# Patient Record
Sex: MALE | Race: WHITE | NOT HISPANIC OR LATINO | Employment: FULL TIME | URBAN - METROPOLITAN AREA
[De-identification: names, ages, dates, MRNs, and addresses within clinical notes are randomized per-mention and may not be internally consistent; named-entity substitution may affect disease eponyms.]

---

## 2017-12-15 ENCOUNTER — OCCUPATIONAL MEDICINE (OUTPATIENT)
Dept: URGENT CARE | Facility: PHYSICIAN GROUP | Age: 22
End: 2017-12-15
Payer: COMMERCIAL

## 2017-12-15 VITALS
OXYGEN SATURATION: 96 % | HEART RATE: 72 BPM | WEIGHT: 142 LBS | BODY MASS INDEX: 20.33 KG/M2 | RESPIRATION RATE: 12 BRPM | DIASTOLIC BLOOD PRESSURE: 74 MMHG | TEMPERATURE: 98.6 F | HEIGHT: 70 IN | SYSTOLIC BLOOD PRESSURE: 122 MMHG

## 2017-12-15 DIAGNOSIS — S01.81XA FACIAL LACERATION, INITIAL ENCOUNTER: ICD-10-CM

## 2017-12-15 PROCEDURE — 12011 RPR F/E/E/N/L/M 2.5 CM/<: CPT | Performed by: EMERGENCY MEDICINE

## 2017-12-15 ASSESSMENT — ENCOUNTER SYMPTOMS
EYE PAIN: 0
BLURRED VISION: 0
NAUSEA: 0
LOSS OF CONSCIOUSNESS: 0
ABDOMINAL PAIN: 0
DOUBLE VISION: 0
EYE REDNESS: 0
CHILLS: 0
FEVER: 0
NECK PAIN: 0
COUGH: 0
VOMITING: 0
NERVOUS/ANXIOUS: 0
SEIZURES: 0

## 2017-12-15 NOTE — LETTER
[facility] [address] [address]  Phone:  [phone] - Fax:  [phone]   [facility] Progress Report and Disability Certification  Date of Service: 12/15/2017   No Show:  No  Date / Time of Next Visit: 12/20/2017   Claim Information   Patient Name: Juvencio Phelps  Claim Number:     Employer: [employer]  Date of Injury: 12/15/2017     Insurer / TPA: Sherin [place]  ID / SSN:     Occupation:  sruthie tech /  Diagnosis: The encounter diagnosis was Facial laceration, initial encounter.    Medical Information   Related to Industrial Injury? Yes    Subjective Complaints:  Patient describes her date of injury 12/15/17. Patient is a pleasant 22-year-old male who was hit by a thin metal grate just lateral to his right eye he received a 2-1/2 cm laceration not involving his eyes or his vision. Patient's last tetanus shot was 9 years ago. This injury occurred approximately 1 PM while working at a local [employer].   Objective Findings: Vital signs 122/74, her rate is 72, respiratory rate 12, temperature 37.0 with a pulse oximetry of 96% on room air.    Patient has a 2 1/2 centimeter laceration just lateral to his right eye. The laceration is through, the skin skin no deep structures and fall. Procedure explained  to the patient, who agreed to undergo with the procedure. The wound was cleaned with normal saline, block with 1% Xylocaine, 6-0 interrupted nylon sutures were placed. Good hemostasis was obtained, Polysporin was placed on the wound. Without any complications.   Pre-Existing Condition(s): Patient has no other job he has no previous injuries to his face or other work-related injuries.   Assessment:   Initial Visit    Status: Additional Care Required  Permanent Disability:No    Plan:   Comments:patient will have the wound sutured. He will keep the wound clean placing Polysporin ointment on the wound 4 times a day.  Return in 5 days to have the sutures removed.    Diagnostics:      Comments:       Disability Information   Status: Released to Full Duty    From:  12/15/2017  Through: 12/20/2017 Restrictions are: Temporary   Physical Restrictions   Sitting:    Standing:    Stooping:    Bending:      Squatting:    Walking:    Climbing:    Pushing:      Pulling:    Other:    Reaching Above Shoulder (L):   Reaching Above Shoulder (R):       Reaching Below Shoulder (L):    Reaching Below Shoulder (R):      Not to exceed Weight Limits   Carrying(hrs):   Weight Limit(lb):   Lifting(hrs):   Weight  Limit(lb):     Comments: Patient will keep the wound clean and covered with Polysporin return in 5 days for suture removal. He will return sooner if he develops any redness swelling signs of infection.    Repetitive Actions   Hands: i.e. Fine Manipulations from Grasping:     Feet: i.e. Operating Foot Controls:     Driving / Operate Machinery:     Physician Name: Edilson Rice M.D. Physician Signature: DANIAL Coffman M.D. e-Signature: Dr. Shayne Ruiz, Medical Director   Clinic Name / Location: [address] [address] Clinic Phone Number: Dept: [phone]   Appointment Time: 1:25 Pm Visit Start Time: 2:12 PM   Check-In Time:  1:50 Pm Visit Discharge Time: 2:47 PM    Original-Treating Physician or Chiropractor    Page 2-Insurer/TPA    Page 3-Employer    Page 4-Employee

## 2017-12-15 NOTE — LETTER
"EMPLOYEE’S CLAIM FOR COMPENSATION/ REPORT OF INITIAL TREATMENT  FORM C-4    EMPLOYEE’S CLAIM - PROVIDE ALL INFORMATION REQUESTED   First Name  Juvencio Last Name  Mattie Birthdate                    1995                Sex  male Claim Number   Home Address  [address] Age  22 y.o. 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 (if applicable)  Closing pit cover grates    How did this injury or occupational disease occur? (Be specific an answer in detail. Use additional sheet if necessary)  When closing the pit cover grates. One grate pulled foward unintentionally when i turned around to grab and pull it foward it slid into my head and cut my eye   If you believe that you have an occupational disease, when did you first have knowledge of the disability and it relationship to your employment?   Witnesses to the Accident  None      Nature of Injury or Occupational Disease  Laceration  Part(s) of Body Injured or Affected  Eye (R), ,     I certify that the above is true and " correct to the best of my knowledge and that I have provided this information in order to obtain the benefits of [place]  I hereby authorize any physician, chiropractor, surgeon, practitioner, or other person, any hospital, including [facility] or [facility], any medical service organization, any insurance company, or other institution or organization to release to each other, any medical or other information, including benefits paid or payable, pertinent to this injury or disease, except information relative to diagnosis, treatment and/or counseling for AIDS, psychological conditions, alcohol or controlled substances, for which I must give specific authorization.  A Photostat of this authorization shall be as valid as the original.     Date   Place   Employee’s Signature   THIS REPORT MUST BE COMPLETED AND MAILED WITHIN 3 WORKING DAYS OF TREATMENT   Place  [facility]  Name of Facility  [place]   Date  12/15/2017 Diagnosis  (S01.81XA) Facial laceration, initial encounter Is there evidence the injured employee was under the influence of alcohol and/or another controlled substance at the time of accident?   Hour  2:12 PM Description of Injury or Disease  The encounter diagnosis was Facial laceration, initial encounter. No   Treatment  Patient had his wound clean block with 1% Xylocaine and sutured with 6-0 interrupted sutures. The wound was subsequently covered with Polysporin patient was given instructions to keep it clean and watch for infection and return in 5 days for suture removal.  Have you advised the patient to remain off work five days or more? No   X-Ray Findings      If Yes   From Date  To Date      From information given by the employee, together with medical evidence, can you directly connect this injury or occupational disease as job incurred?  Yes If No  "Full Duty  Yes Modified Duty      Is additional medical care by a physician indicated?  Yes If Modified Duty, Specify any Limitations / Restrictions  Please refer to the D-39 form.   Do you know of any previous injury or disease contributing to this condition or occupational disease?                            No   Date  12/15/2017 Print Doctor’s Name Edilson Rice M.D. I certify the employer’s copy of  this form was mailed on:   Address  [address] [facility] Insurer’s Use Only     [facility]  [address]    Provider’s Tax ID Number  [id number] Telephone  Dept: [phone]        e-DANIAL Ojeda M.D.   e-Signature: Dr. Shayne Ruiz, Medical Director Degree  MD        ORIGINAL-TREATING PHYSICIAN OR CHIROPRACTOR    PAGE 2-INSURER/TPA    PAGE 3-EMPLOYER    PAGE 4-EMPLOYEE             Form C-4 (rev10/07)              BRIEF DESCRIPTION OF RIGHTS AND BENEFITS  (Pursuant to [place]RS 616C.050)    Notice of Injury or Occupational Disease (Incident Report Form C-1): If an injury or occupational disease (OD) arises out of and in the  course of employment, you must provide written notice to your employer as soon as practicable, but no later than 7 days after the accident or  OD. Your employer shall maintain a sufficient supply of the required forms.    Claim for Compensation (Form C-4): If medical treatment is sought, the form C-4 is available at the place of initial treatment. A completed  \"Claim for Compensation\" (Form C-4) must be filed within 90 days after an accident or OD. The treating physician or chiropractor must,  within 3 working days after treatment, complete and mail to the employer, the employer's insurer and third-party , the Claim for  Compensation.    Medical Treatment: If you require medical treatment for your on-the-job injury or OD, you may be required to select a physician or  chiropractor from a list provided by your workers’ compensation insurer, if it has contracted " with an Organization for Managed Care (MCO) or  Preferred Provider Organization (PPO) or providers of health care. If your employer has not entered into a contract with an MCO or PPO, you  may select a physician or chiropractor from the Panel of Physicians and Chiropractors. Any medical costs related to your industrial injury or  OD will be paid by your insurer.    Temporary Total Disability (TTD): If your doctor has certified that you are unable to work for a period of at least 5 consecutive days, or 5  cumulative days in a 20-day period, or places restrictions on you that your employer does not accommodate, you may be entitled to TTD  compensation.    Temporary Partial Disability (TPD): If the wage you receive upon reemployment is less than the compensation for TTD to which you are  entitled, the insurer may be required to pay you TPD compensation to make up the difference. TPD can only be paid for a maximum of 24  months.    Permanent Partial Disability (PPD): When your medical condition is stable and there is an indication of a PPD as a result of your injury or  OD, within 30 days, your insurer must arrange for an evaluation by a rating physician or chiropractor to determine the degree of your PPD. The  amount of your PPD award depends on the date of injury, the results of the PPD evaluation and your age and wage.    Permanent Total Disability (PTD): If you are medically certified by a treating physician or chiropractor as permanently and totally disabled  and have been granted a PTD status by your insurer, you are entitled to receive monthly benefits not to exceed 66 2/3% of your average  monthly wage. The amount of your PTD payments is subject to reduction if you previously received a PPD award.    Vocational Rehabilitation Services: You may be eligible for vocational rehabilitation services if you are unable to return to the job due to a  permanent physical impairment or permanent restrictions as a result of  your injury or occupational disease.    Transportation and Per Ulices Reimbursement: You may be eligible for travel expenses and per ulices associated with medical treatment.    Reopening: You may be able to reopen your claim if your condition worsens after claim closure.    Appeal Process: If you disagree with a written determination issued by the insurer or the insurer does not respond to your request, you may  appeal to the Department of Administration, , by following the instructions contained in your determination letter. You must  appeal the determination within 70 days from the date of the determination letter at [address], [address], [place], [place]  [address], or [address], [address], [place], [place] [address]. If you disagree with the  decision, you may appeal to the  Department of Administration, . You must file your appeal within 30 days from the date of the  decision  letter at [address], [address], [place], [place] [address], or [address] [facility] [address] [place], [place] [address]. If you  disagree with a decision of an , you may file a petition for judicial review with the District Court. You must do so within 30  days of the Appeal Officer’s decision. You may be represented by an  at your own expense or you may contact the [facility] for possible  representation.    [place]  for Injured Workers (NAIW): If you disagree with a  decision, you may request that NAIW represent you  without charge at an  Hearing. For information regarding denial of benefits, you may contact the [facility] at: [address], [address] [address], [phone], or [address] [facility] [address], [place] [address], [phone]    To File a Complaint with the Division: If you wish to file a complaint with the  of the Division of Industrial  Relations (DIR),  please contact the Workers’ Compensation Section, [address], [address], [place], [place] [address], telephone (443) 072-9335, or  [address], [address], [place], [place] [address], telephone [phone].    For assistance with Workers’ Compensation Issues: you may contact the Office of the [facility] Consumer Health Assistance, [address] [place] [address], [address], [place], [place] [address], Toll Free 1-995.877.3533, Web site: http://govcha.[facility].nv., E-mail  Trina@[facility].nv.                                                                                                                                                                                                                                   __________________________________________________________________                                                                   _________________                Employee Name / Signature                                                                                                                                                       Date                                                                                                                                                                                                     D-2 (rev. 10/07)

## 2017-12-15 NOTE — PROGRESS NOTES
"Subjective:      Juvencio Phelps is a 22 y.o. male who presents with Facial Laceration (WC new, above R eye )      Patient describes her date of injury 12/15/17. Patient is a pleasant 22-year-old male who was hit by a thin metal grate just lateral to his right eye he received a 2-1/2 cm laceration not involving his eyes or his vision. Patient's last tetanus shot was 9 years ago. 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     HPI    Review of Systems   Constitutional: Negative for chills and fever.   Eyes: Negative for blurred vision, double vision, pain and redness.   Respiratory: Negative for cough.    Cardiovascular: Negative for chest pain.   Gastrointestinal: Negative for abdominal pain, nausea and vomiting.   Musculoskeletal: Negative for neck pain.   Skin:        Laceration lateral to his right eye between his eyebrow and upper lid see the diagram. 2-1/2 cm in length.   Neurological: Negative for seizures and loss of consciousness.   Psychiatric/Behavioral: The patient is not nervous/anxious.           Objective:     /74   Pulse 72   Temp 37 °C (98.6 °F)   Resp 12   Ht 1.778 m (5' 10\")   Wt 64.4 kg (142 lb)   SpO2 96%   BMI 20.37 kg/m²      Physical Exam    Vital signs 122/74, her rate is 72, respiratory rate 12, temperature 37.0 with a pulse oximetry of 96% on room air.    Patient has a 2 1/2 centimeter laceration just lateral to his right eye. The laceration is through, the skin skin no deep structures and fall. Procedure explained  to the patient, who agreed to undergo with the procedure. The wound was cleaned with normal saline, block with 1% Xylocaine, 6-0 interrupted nylon sutures were placed. Good hemostasis was obtained, Polysporin was placed on the wound. Without any complications.       Assessment/Plan:     1. Facial laceration, initial encounter    Please refer to the D-39 form    "

## 2017-12-20 ENCOUNTER — OCCUPATIONAL MEDICINE (OUTPATIENT)
Dept: URGENT CARE | Facility: PHYSICIAN GROUP | Age: 22
End: 2017-12-20
Payer: COMMERCIAL

## 2017-12-20 VITALS
TEMPERATURE: 98.2 F | RESPIRATION RATE: 16 BRPM | HEART RATE: 76 BPM | HEIGHT: 70 IN | SYSTOLIC BLOOD PRESSURE: 124 MMHG | DIASTOLIC BLOOD PRESSURE: 68 MMHG | WEIGHT: 142 LBS | OXYGEN SATURATION: 98 % | BODY MASS INDEX: 20.33 KG/M2

## 2017-12-20 DIAGNOSIS — S01.81XD FACIAL LACERATION, SUBSEQUENT ENCOUNTER: ICD-10-CM

## 2017-12-20 ASSESSMENT — ENCOUNTER SYMPTOMS
NAUSEA: 0
EYE REDNESS: 0
EYE DISCHARGE: 0
NERVOUS/ANXIOUS: 0
FEVER: 0
NECK PAIN: 0
CHILLS: 0
EYE PAIN: 0

## 2017-12-20 NOTE — LETTER
[facility] [address] [place] [address]  Phone:  [phone] - Fax:  [phone]   [facility] Progress Report and Disability Certification  Date of Service: 12/20/2017   No Show:  No  Date / Time of Next Visit:  patient released to work with MMIAs of today.    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    Medical Information   Related to Industrial Injury? Yes    Subjective Complaints:   date of injury 12/15/17 patient 22-year-old hit by a thin metal grate while at work cutting his face just lateral to his right eye with a 2 cm laceration. It was sutured the day of injury. Patient had 3 sutures placed this had no complications since that time treatment.   Objective Findings: Patient's blood pressure is 124/68 temperature is 36.8. Patient has a well-healed laceration lateral to his right eye with 3 interrupted 6-0 sutures in place. There is ecchymosis around his eyelids that wasn't present at the time of his injury secondary to migrating hematoma. Pupils are equal round reactive to light. Patient has no compromise of physician.   Pre-Existing Condition(s): None.   Assessment:   Condition Improved    Status: Discharged /  MMI  Permanent Disability:No    Plan:   Comments:patient had sutures removed in the urgent care will keep the wound covered with Polysporin.    Diagnostics:      Comments:       Disability Information   Status: Released to Full Duty    From:     Through:   Restrictions are:     Physical Restrictions   Sitting:    Standing:    Stooping:    Bending:      Squatting:    Walking:    Climbing:    Pushing:      Pulling:    Other:    Reaching Above Shoulder (L):   Reaching Above Shoulder (R):       Reaching Below Shoulder (L):   Reaching Below Shoulder (R):      Not to exceed Weight Limits   Carrying(hrs):   Weight Limit(lb):   Lifting(hrs):   Weight  Limit(lb):     Comments: Patient will return to work with MMI.    Repetitive Actions   Hands: i.e. Fine Manipulations from Grasping:     Feet: i.e. Operating Foot Controls:     Driving / Operate Machinery:     Physician Name: Edilson Rice M.D. Physician Signature: DANIAL Coffman M.D. e-Signature: Dr. Shayne Ruiz, Medical Director   Clinic Name / Location: [address] [address] Clinic Phone Number: Dept: [phone]   Appointment Time: 4:00 Pm Visit Start Time: 4:13 PM   Check-In Time:  4:12 Pm Visit Discharge Time: 4:23 PM    Original-Treating Physician or Chiropractor    Page 2-Insurer/TPA    Page 3-Employer    Page 4-Employee

## 2017-12-20 NOTE — LETTER
there are there is to form [id number]   [facility] [place]  [address] [place], [place] [address], [place] [address]  Phone:  [phone] - Fax:  [phone]   [facility] Progress Report and Disability Certification  Date of Service: 12/20/2017   No Show:  No  Date / Time of Next Visit:     Claim Information   Patient Name: Juvencio Phelps  Claim Number:     Employer: [employer]  Date of Injury: 12/15/2017     Insurer / TPA: Sherin [place]  ID / SSN:     Occupation:   Diagnosis: The encounter diagnosis was Facial laceration, subsequent encounter.    Medical Information   Related to Industrial Injury? Yes   Subjective Complaints:   date of injury 12/15/17 patient 22-year-old hit by a thin metal grate while at work cutting his face just lateral to his right eye with a 2 cm laceration. It was sutured the day of injury. Patient had 3 sutures placed this had no complications since that time treatment.   Objective Findings: Patient's blood pressure is 124/68 temperature is 36.8. Patient has a well-healed laceration lateral to his right eye with 3 interrupted 6-0 sutures in place. There is ecchymosis around his eyelids that wasn't present at the time of his injury secondary to migrating hematoma. Pupils are equal round reactive to light. Patient has no compromise of physician.   Pre-Existing Condition(s): None.   Assessment:   Condition Improved    Status: Discharged /  MMI  Permanent Disability:No    Plan:   Comments:patient had sutures removed in the urgent care will keep the wound covered with Polysporin.    Diagnostics:      Comments:       Disability Information   Status: Released to Full Duty    From:     Through:   Restrictions are:     Physical Restrictions   Sitting:    Standing:    Stooping:    Bending:      Squatting:    Walking:    Climbing:    Pushing:      Pulling:    Other:    Reaching Above Shoulder (L):   Reaching Above Shoulder (R):       Reaching  Below Shoulder (L):    Reaching Below Shoulder (R):      Not to exceed Weight Limits   Carrying(hrs):   Weight Limit(lb):   Lifting(hrs):   Weight  Limit(lb):     Comments: Patient will return to work with MMI.    Repetitive Actions   Hands: i.e. Fine Manipulations from Grasping:     Feet: i.e. Operating Foot Controls:     Driving / Operate Machinery:     Physician Name: Edilson Rice M.D. Physician Signature: DANIAL Coffman M.D. e-Signature: Dr. Shayne Ruiz, Medical Director   Clinic Name / Location: [address] [address] Clinic Phone Number: Dept: [phone]   Appointment Time: 4:00 Pm Visit Start Time: 4:13 PM   Check-In Time:  4:12 Pm Visit Discharge Time: 4:25 PM    Original-Treating Physician or Chiropractor    Page 2-Insurer/TPA    Page 3-Employer    Page 4-Employee

## 2017-12-21 NOTE — PROGRESS NOTES
"T5 to work cough or disease transfer to the patient's chart used a lot of 5 he had just a problems and now is a never had a problem ID was I just didn't get it organized Subjective:      Juvencio Phelps is a 22 y.o. male who presents with Facial Laceration ( follow up eye lac)       date of injury 12/15/17 patient 22-year-old hit by a thin metal grate while at work cutting his face just lateral to his right eye with a 2 cm laceration. It was sutured the day of injury. Patient had 3 sutures placed this had no complications since that time treatment.     HPI    Review of Systems   Constitutional: Negative for chills and fever.   Eyes: Negative for pain, discharge and redness.        Patient has ecchymosis around the right eye.   Gastrointestinal: Negative for nausea.   Musculoskeletal: Negative for neck pain.   Skin:        Well-healed laceration to the right eye.   Psychiatric/Behavioral: The patient is not nervous/anxious.         Objective:     /68   Pulse 76   Temp 36.8 °C (98.2 °F)   Resp 16   Ht 1.778 m (5' 10\")   Wt 64.4 kg (142 lb)   SpO2 98%   BMI 20.37 kg/m²      Physical Exam    Patient's blood pressure is 124/68 temperature is 36.8. Patient has a well-healed laceration lateral to his right eye with 3 interrupted 6-0 sutures in place. There is ecchymosis around his eyelids that wasn't present at the time of his injury secondary to migrating hematoma. Pupils are equal round reactive to light. Patient has no compromise of physician.       Assessment/Plan:     Laceration to the face..    Please refer to the D-39 form.  "

## 2019-04-28 ENCOUNTER — HOSPITAL ENCOUNTER (EMERGENCY)
Dept: HOSPITAL 8 - ED | Age: 24
Discharge: HOME | End: 2019-04-28
Payer: COMMERCIAL

## 2019-04-28 VITALS — DIASTOLIC BLOOD PRESSURE: 77 MMHG | SYSTOLIC BLOOD PRESSURE: 136 MMHG

## 2019-04-28 VITALS — HEIGHT: 70 IN | BODY MASS INDEX: 20.83 KG/M2 | WEIGHT: 145.51 LBS

## 2019-04-28 DIAGNOSIS — K04.7: Primary | ICD-10-CM

## 2019-04-28 DIAGNOSIS — K08.89: ICD-10-CM

## 2019-04-28 PROCEDURE — 99283 EMERGENCY DEPT VISIT LOW MDM: CPT

## 2019-04-28 PROCEDURE — 96372 THER/PROPH/DIAG INJ SC/IM: CPT

## 2019-04-28 NOTE — NUR
PT GIVEN DC INSTRUCTIONS AND SCRIPTS. PT EDUCATED REGARDING DC MEDICATIONS. 
PT'S AOX4. RESPS EVEN AND UNLABORED. PT AMB TO DC WITH STEADY GAIT. NO ACUTE 
DISTRESS AT DC.

## 2019-04-28 NOTE — NUR
FIRST CONTACT WITH PT. PT C/O DENTAL PAIN ( RIGHT LOWER) SINCE MONDAY. SPEECH 
INTACT. PT DENIES SWELLING/DISCHARGE. PT'S AOX4. RESPS EVEN AND UNLABORED.

## 2023-03-31 ENCOUNTER — APPOINTMENT (OUTPATIENT)
Dept: URGENT CARE | Facility: PHYSICIAN GROUP | Age: 28
End: 2023-03-31

## 2023-11-15 ENCOUNTER — APPOINTMENT (RX ONLY)
Dept: URBAN - METROPOLITAN AREA CLINIC 22 | Facility: CLINIC | Age: 28
Setting detail: DERMATOLOGY
End: 2023-11-15

## 2023-11-15 DIAGNOSIS — Z71.89 OTHER SPECIFIED COUNSELING: ICD-10-CM

## 2023-11-15 DIAGNOSIS — L81.4 OTHER MELANIN HYPERPIGMENTATION: ICD-10-CM

## 2023-11-15 DIAGNOSIS — L40.0 PSORIASIS VULGARIS: ICD-10-CM

## 2023-11-15 DIAGNOSIS — D18.0 HEMANGIOMA: ICD-10-CM

## 2023-11-15 DIAGNOSIS — B35.3 TINEA PEDIS: ICD-10-CM

## 2023-11-15 DIAGNOSIS — D22 MELANOCYTIC NEVI: ICD-10-CM

## 2023-11-15 PROBLEM — D22.5 MELANOCYTIC NEVI OF TRUNK: Status: ACTIVE | Noted: 2023-11-15

## 2023-11-15 PROBLEM — D18.01 HEMANGIOMA OF SKIN AND SUBCUTANEOUS TISSUE: Status: ACTIVE | Noted: 2023-11-15

## 2023-11-15 PROBLEM — L30.9 DERMATITIS, UNSPECIFIED: Status: ACTIVE | Noted: 2023-11-15

## 2023-11-15 PROCEDURE — 99204 OFFICE O/P NEW MOD 45 MIN: CPT | Mod: 25

## 2023-11-15 PROCEDURE — ? PRESCRIPTION

## 2023-11-15 PROCEDURE — ? SUNSCREEN RECOMMENDATIONS

## 2023-11-15 PROCEDURE — ? COUNSELING

## 2023-11-15 PROCEDURE — 11102 TANGNTL BX SKIN SINGLE LES: CPT

## 2023-11-15 PROCEDURE — ? BIOPSY BY SHAVE METHOD

## 2023-11-15 RX ORDER — KETOCONAZOLE 20 MG/G
CREAM TOPICAL BID
Qty: 60 | Refills: 2 | Status: ERX | COMMUNITY
Start: 2023-11-15

## 2023-11-15 RX ADMIN — KETOCONAZOLE: 20 CREAM TOPICAL at 00:00

## 2023-11-15 ASSESSMENT — LOCATION ZONE DERM
LOCATION ZONE: TRUNK
LOCATION ZONE: TOE

## 2023-11-15 ASSESSMENT — LOCATION DETAILED DESCRIPTION DERM
LOCATION DETAILED: UMBILICUS
LOCATION DETAILED: LEFT DISTAL PLANTAR GREAT TOE
LOCATION DETAILED: RIGHT DISTAL PLANTAR GREAT TOE
LOCATION DETAILED: EPIGASTRIC SKIN
LOCATION DETAILED: LEFT SUPERIOR MEDIAL UPPER BACK

## 2023-11-15 ASSESSMENT — SEVERITY ASSESSMENT: SEVERITY: MILD

## 2023-11-15 ASSESSMENT — LOCATION SIMPLE DESCRIPTION DERM
LOCATION SIMPLE: RIGHT GREAT TOE
LOCATION SIMPLE: LEFT UPPER BACK
LOCATION SIMPLE: LEFT GREAT TOE
LOCATION SIMPLE: ABDOMEN

## 2023-11-15 NOTE — PROCEDURE: BIOPSY BY SHAVE METHOD
Detail Level: Detailed
Depth Of Biopsy: dermis
PT
PT
Was A Bandage Applied: Yes
Size Of Lesion In Cm: 0
Biopsy Type: H and E
Biopsy Method: Personna blade
Anesthesia Type: 1% lidocaine with 1:100,000 epinephrine and a 1:3 solution of 8.4% sodium bicarbonate
Anesthesia Volume In Cc: 1
Hemostasis: Drysol
Wound Care: Vaseline
Dressing: bandage
Destruction After The Procedure: No
Type Of Destruction Used: Curettage
Curettage Text: The wound bed was treated with curettage after the biopsy was performed.
Cryotherapy Text: The wound bed was treated with cryotherapy after the biopsy was performed.
Electrodesiccation Text: The wound bed was treated with electrodesiccation after the biopsy was performed.
Electrodesiccation And Curettage Text: The wound bed was treated with electrodesiccation and curettage after the biopsy was performed.
Silver Nitrate Text: The wound bed was treated with silver nitrate after the biopsy was performed.
Lab: [id number]
Lab Facility: 
Consent: Written consent was obtained and risks were reviewed including but not limited to scarring, infection, bleeding, scabbing, incomplete removal, nerve damage and allergy to anesthesia.
Post-Care Instructions: I reviewed with the patient in detail post-care instructions. Patient is to keep the biopsy site dry overnight, and then apply bacitracin twice daily until healed. Patient may apply hydrogen peroxide soaks to remove any crusting.
Notification Instructions: Patient will be notified of biopsy results. However, patient instructed to call the office if not contacted within 2 weeks.
Billing Type: Third-Party Bill
Information: Selecting Yes will display possible errors in your note based on the variables you have selected. This validation is only offered as a suggestion for you. PLEASE NOTE THAT THE VALIDATION TEXT WILL BE REMOVED WHEN YOU FINALIZE YOUR NOTE. IF YOU WANT TO FAX A PRELIMINARY NOTE YOU WILL NEED TO TOGGLE THIS TO 'NO' IF YOU DO NOT WANT IT IN YOUR FAXED NOTE.

## 2023-11-21 ENCOUNTER — RX ONLY (OUTPATIENT)
Age: 28
Setting detail: RX ONLY
End: 2023-11-21

## 2023-11-21 RX ORDER — CLOBETASOL PROPIONATE 0.5 MG/G
CREAM TOPICAL
Qty: 30 | Refills: 1 | Status: ERX | COMMUNITY
Start: 2023-11-20

## 2024-04-23 ENCOUNTER — NON-PROVIDER VISIT (OUTPATIENT)
Dept: OCCUPATIONAL MEDICINE | Facility: CLINIC | Age: 29
End: 2024-04-23

## 2024-04-23 ENCOUNTER — APPOINTMENT (OUTPATIENT)
Dept: OCCUPATIONAL MEDICINE | Facility: CLINIC | Age: 29
End: 2024-04-23
Payer: COMMERCIAL

## 2024-04-23 DIAGNOSIS — Z02.89 ENCOUNTER FOR OCCUPATIONAL HEALTH EXAMINATION INVOLVING RESPIRATOR: ICD-10-CM

## 2024-04-23 PROCEDURE — 94375 RESPIRATORY FLOW VOLUME LOOP: CPT | Performed by: NURSE PRACTITIONER
